# Patient Record
(demographics unavailable — no encounter records)

---

## 2025-02-06 NOTE — PHYSICAL EXAM
[Alert] : alert [Well Nourished] : well nourished [No Acute Distress] : no acute distress [Well Developed] : well developed [Thyroid Not Enlarged] : the thyroid was not enlarged [No Thyroid Nodules] : no palpable thyroid nodules [Regular Rhythm] : with a regular rhythm [No Edema] : no peripheral edema [Normal Anterior Cervical Nodes] : no anterior cervical lymphadenopathy [Normal Posterior Cervical Nodes] : no posterior cervical lymphadenopathy [Spine Straight] : spine straight

## 2025-02-06 NOTE — ASSESSMENT
[FreeTextEntry1] : Hypothyroidism. Osteopenia. Hyperlipidemia. Encounter for weight management.  Will continue current tx. Discussed therapeutic options for osteopenia - will repeat bone density in 8/2026. Medications refilled. F/U - 6 months.

## 2025-02-06 NOTE — HISTORY OF PRESENT ILLNESS
[FreeTextEntry1] : 56 y.o. female, not previously seen by me, with a h/o hypothyroidism since the age of 14 or 15. The patient is now on 0.1 mg T4 daily and feels "great" on this dose even though her TSH is suppressed and she has been advised to reduce the dose of T4. 2/2/23. The patient is feeling well. She is now on 0.1 mg T4 6 days a week. She understands that this dose may be excessive but prefers to stay on this tx. Bone density report 8/26/22 is c/w osteopenia. 8/3/23. The patient's condition is stable. She continues on T4 0.1 mg 6 days/week. Feels well on this regimen. She has gained 6 lb. 2/1/24. The patient is doing well. She has lost 9 lb and feels good about her current weight although she feels that her ideal weight is120 lb - still 5 lb less than the current weight. She continues on 0.1 mg T4 six days/week and Vit D supplementation. Last Bone density 8/26/22 - osteopenia. Last thyroid u/sound 8/26/22 - no nodules. 8/1/24. The patient is doing well but she thinks she might have gained some weight (recorded weight previous visit - 125 lb; today - 126 lb). Otherwise she feels well. She continues on thyroid hormone supplementation. Last Bone density 8/26/22 - osteopenia. Last thyroid u/sound 8/26/22 - no nodules. 2/6/25. The patient is doing well overall. She continues on thyroid hormone supplementation realizing that she is on a dose of T4 which is higher than recommended but this is where she feels the best. TSH was 0.08 on 8/1/25. Thyroid u/sound 8/30/24 - no nodules. Bone density on the same date - osteopenia. She has lost 2 lb and is now at her weight goal.